# Patient Record
Sex: MALE | Race: WHITE | NOT HISPANIC OR LATINO | Employment: OTHER | URBAN - METROPOLITAN AREA
[De-identification: names, ages, dates, MRNs, and addresses within clinical notes are randomized per-mention and may not be internally consistent; named-entity substitution may affect disease eponyms.]

---

## 2024-11-03 ENCOUNTER — OFFICE VISIT (OUTPATIENT)
Dept: URGENT CARE | Age: 69
End: 2024-11-03
Payer: MEDICARE

## 2024-11-03 ENCOUNTER — HOSPITAL ENCOUNTER (EMERGENCY)
Facility: HOSPITAL | Age: 69
Discharge: HOME | End: 2024-11-04
Attending: EMERGENCY MEDICINE
Payer: MEDICARE

## 2024-11-03 VITALS
BODY MASS INDEX: 28.25 KG/M2 | WEIGHT: 180 LBS | TEMPERATURE: 97.3 F | DIASTOLIC BLOOD PRESSURE: 77 MMHG | OXYGEN SATURATION: 96 % | SYSTOLIC BLOOD PRESSURE: 152 MMHG | HEART RATE: 67 BPM | RESPIRATION RATE: 16 BRPM | HEIGHT: 67 IN

## 2024-11-03 VITALS
OXYGEN SATURATION: 95 % | SYSTOLIC BLOOD PRESSURE: 152 MMHG | RESPIRATION RATE: 16 BRPM | TEMPERATURE: 97.7 F | DIASTOLIC BLOOD PRESSURE: 80 MMHG | HEART RATE: 68 BPM

## 2024-11-03 DIAGNOSIS — H53.8 BLURRED VISION, LEFT EYE: ICD-10-CM

## 2024-11-03 DIAGNOSIS — S05.02XA ABRASION OF LEFT CORNEA, INITIAL ENCOUNTER: Primary | ICD-10-CM

## 2024-11-03 DIAGNOSIS — H57.89 RED EYE: Primary | ICD-10-CM

## 2024-11-03 DIAGNOSIS — H57.12 LEFT EYE PAIN: ICD-10-CM

## 2024-11-03 PROCEDURE — 99284 EMERGENCY DEPT VISIT MOD MDM: CPT

## 2024-11-03 PROCEDURE — 99284 EMERGENCY DEPT VISIT MOD MDM: CPT | Performed by: EMERGENCY MEDICINE

## 2024-11-03 PROCEDURE — 2500000005 HC RX 250 GENERAL PHARMACY W/O HCPCS

## 2024-11-03 RX ORDER — TETRACAINE HYDROCHLORIDE 5 MG/ML
1 SOLUTION OPHTHALMIC ONCE
Status: COMPLETED | OUTPATIENT
Start: 2024-11-03 | End: 2024-11-03

## 2024-11-03 ASSESSMENT — VISUAL ACUITY
OD_CC: 20/40
OU: 1
OS_CC: 20/40

## 2024-11-03 ASSESSMENT — COLUMBIA-SUICIDE SEVERITY RATING SCALE - C-SSRS
6. HAVE YOU EVER DONE ANYTHING, STARTED TO DO ANYTHING, OR PREPARED TO DO ANYTHING TO END YOUR LIFE?: NO
1. IN THE PAST MONTH, HAVE YOU WISHED YOU WERE DEAD OR WISHED YOU COULD GO TO SLEEP AND NOT WAKE UP?: NO
2. HAVE YOU ACTUALLY HAD ANY THOUGHTS OF KILLING YOURSELF?: NO

## 2024-11-03 ASSESSMENT — PAIN SCALES - GENERAL: PAINLEVEL_OUTOF10: 6

## 2024-11-03 ASSESSMENT — PAIN DESCRIPTION - LOCATION: LOCATION: EYE

## 2024-11-03 ASSESSMENT — ENCOUNTER SYMPTOMS
NEUROLOGICAL NEGATIVE: 1
RESPIRATORY NEGATIVE: 1
CARDIOVASCULAR NEGATIVE: 1
EYE DISCHARGE: 1
EYE REDNESS: 1
PSYCHIATRIC NEGATIVE: 1
EYE PAIN: 1
CONSTITUTIONAL NEGATIVE: 1
PHOTOPHOBIA: 1

## 2024-11-03 ASSESSMENT — PAIN DESCRIPTION - ORIENTATION: ORIENTATION: LEFT

## 2024-11-03 ASSESSMENT — PAIN - FUNCTIONAL ASSESSMENT: PAIN_FUNCTIONAL_ASSESSMENT: 0-10

## 2024-11-04 RX ORDER — ERYTHROMYCIN 5 MG/G
OINTMENT OPHTHALMIC NIGHTLY
Qty: 1 G | Refills: 0 | Status: SHIPPED | OUTPATIENT
Start: 2024-11-04 | End: 2024-11-18

## 2024-11-04 RX ORDER — MOXIFLOXACIN 5 MG/ML
1 SOLUTION/ DROPS OPHTHALMIC 4 TIMES DAILY
Qty: 3 ML | Refills: 0 | Status: SHIPPED | OUTPATIENT
Start: 2024-11-04 | End: 2024-11-14

## 2024-11-04 NOTE — CONSULTS
Reason for consult: left eye pain and redness     HPI:  70 yo M with PMHx of HTN presents with 2 days of left eye pain, redness, photophobia, and clear drainage. Pt was changing cat litter Friday (11/1/24) night and then a few hrs later noticed left eye irritation/pain which then included clear drainage and photophobia. Pt thinks that he may have gotten some of the cat litter into his left eye. He thoroughly rinsed both eyes at home.  Endorses some blurry vision in left eye.     No contact lens use or prior eye surgery. No hx of immunosuppression. Pt is not immunocompromised.   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    ROS: Patient denies double vision, blind spots, flashes, curtain coming over vision, or new floaters.     PMHx: please refer to admission history and physical   Medications: please refer to medication reconciliation  Allergies: please refer to patient allergy list  Past Ocular History: as per above HPI  Family History: reviewed and noncontributory to chief ophthalmic complaint    Examination:   Base Eye Exam       Visual Acuity (Snellen - Linear)         Right Left    Near cc 20/20- 20/25+2 ph 20/20-              Tonometry (Tonopen)         Right Left    Pressure 13 12              Pupils         Dark Light Shape React APD    Right 4 2 Round Brisk -    Left 3.5 2 Round Brisk -              Visual Fields (Counting fingers)         Left Right     Full Full              Extraocular Movement         Right Left     Full Full              Neuro/Psych       Oriented x3: Yes    Mood/Affect: Normal              Dilation       Both eyes: 1% Mydriacyl & 2.5% Mumtaz  @ 11:36 PM                  Additional Tests       Color         Right Left    Ishihara 1/11 1/11   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                  Slit Lamp and Fundus Exam       External Exam         Right Left    External Normal Normal              Slit Lamp Exam         Right Left    Lids/Lashes Normal Normal    Conjunctiva/Sclera  White and quiet 1+ Injection    Cornea 1+ inferior SPK epi defect approx 4x3 mm superotemporal    Anterior Chamber Deep and quiet Deep and quiet    Iris Round and reactive Round and reactive    Lens 1+ CS, tr NS tr NS, tr CS    Anterior Vitreous Normal Normal    Swept fornices, no foreign body (FB) in either eye              Fundus Exam         Right Left    Disc Normal Normal    C/D Ratio 0.25 0.25    Macula Normal Normal    Vessels Normal Normal    Periphery Normal Normal                      Assessment and Plan:  68 yo M with PMHx of HTN presents with 2 days of left eye pain, redness, photophobia, and clear drainage. Pt was changing cat litter Friday (11/1/24) night and then a few hrs later noticed left eye irritation/pain which then included clear drainage and photophobia. Entrance testing notable for poor color vision with ishihara plates in both eyes but pt reports hx of issues with color vision since childhood. Epi defect seen in left eye. Both fornices swept and no foreign body (FB) present.     #Corneal abrasion, left eye   - No infiltrates visualized on exam  - fornices swept both eyes, no foreign body (FB) found   - 1+ SPK right eye; approx 4x3 mm epi defect left eye   - Recommend artificial tears QID both eyes, moxifloxacin eye drops QID left eye (10 days), erythmycin ointment at night both eyes (1-2 wks)   - Follow-up with  [facility], within 2 days. Please call [phone] (EYES) to make appointment.  - Patient's contact number: [phone]. Patient lives in [place] and will try to set up visit with optometry in 2 days. If he cannot get an appt closer to home, advised making appt with .         Mauricio Cleary MD   Ophthalmology PGY-2    -----------------------------  Ophthalmology Adult Pager - [phone]  Ophthalmology Pediatrics Pager - [phone] (weekdays 8 am - 5 pm)     For adult follow-up appointments, call: [phone]  For pediatric follow-up appointments, call: [phone]    NOTE: This note is  not finalized until attending reviews and signs.

## 2024-11-04 NOTE — DISCHARGE INSTRUCTIONS
You were seen in the emergency department for a corneal abrasion.  In order to treat this, we have prescribed moxifloxacin eyedrops, erythromycin ointment, and artificial tears.  Placed the moxifloxacin (Vigamox) drops in the eye 4 times daily, placed the erythromycin ointment in the eye at night before bed and use the artificial tears up to 4 times a day for irritation of the eye.  It is important that you follow-up with an ophthalmologist within the next 2 days.  Return the Emergency Department if you notice worsening vision, increasing pain, worsening redness swelling around the eye or any other concerning symptom.

## 2024-11-04 NOTE — ED TRIAGE NOTES
PT presents to ED via triage from urgent care for chief complaints of eye problem. PT states early Saturday morning his symptoms started. PT states he was changing cat litter and got some splashed back in his eye. When he woke up his left eye was swollen, red, painful, and has been having discharge. PT has a history of HTN. PT is Aox4 and ambulates on his own.

## 2024-11-04 NOTE — ED PROVIDER NOTES
HPI   Chief Complaint   Patient presents with    Foreign Body in Eye    Eye Drainage       HPI  Patient is a 69-year-old past medical history of hypertension presenting with 2 days of left eye pain, redness, and drainage.  Patient said he was changing a cart later on Friday night and then later got into his eye.  About 4 hours later patient started noticing pain and clear drainage.  Patient then started experiencing photophobia so he went to the urgent care.  At the urgent care, patient was told to come to the emergency department.  Patient denies any nausea, vomiting, fever and chills.  Patient is endorsing severe pain with tearing and photophobia.      Patient History   History reviewed. No pertinent past medical history.  History reviewed. No pertinent surgical history.  No family history on file.  Social History     Tobacco Use    Smoking status: Unknown    Smokeless tobacco: Not on file   Substance Use Topics    Alcohol use: Not on file    Drug use: Not on file       Physical Exam   ED Triage Vitals [11/03/24 1941]   Temperature Heart Rate Respirations BP   36.3 °C (97.3 °F) 67 16 152/77      Pulse Ox Temp Source Heart Rate Source Patient Position   96 % Temporal Monitor Sitting      BP Location FiO2 (%)     -- --       Physical Exam  Constitutional:       Appearance: Normal appearance.   HENT:      Head: Normocephalic and atraumatic.      Comments: Left eye redness.  Clear drainage.  Cardiovascular:      Rate and Rhythm: Normal rate and regular rhythm.      Pulses: Normal pulses.      Heart sounds: Normal heart sounds.   Pulmonary:      Effort: Pulmonary effort is normal.      Breath sounds: Normal breath sounds.   Abdominal:      General: Abdomen is flat. Bowel sounds are normal.      Palpations: Abdomen is soft.   Skin:     Capillary Refill: Capillary refill takes 2 to 3 seconds.   Neurological:      General: No focal deficit present.      Mental Status: He is alert and oriented to person, place, and time.  Mental status is at baseline.           ED Course & MDM   Diagnoses as of 11/04/24 1559   Abrasion of left cornea, initial encounter                 No data recorded     Dario Coma Scale Score: 15 (11/03/24 1942 : Zen Gaming RN)                           Medical Decision Making  Patient is a 69-year-old past medical history of hypertension presenting with 2 days of left eye pain, redness, and drainage.  The differential diagnoses considered for this patient were corneal abrasion, versus infection.  At bedside patient was hemodynamically stable and reserved.  On physical exam, patient left eye looked red with clear drainage around the eye.  The fluorescein test showed accumulation of the fluorescein light on the left cornea.  The ophthalmology team was consulted.  The ophthalmology team wanted patient on artificial tears, moxifloxacin, and erythromycin.  Patient was then discharged with follow-up with ophthalmology in 2 days.    Procedure  Procedures     Case Davison MD  Resident  11/04/24 0077